# Patient Record
Sex: FEMALE | Race: BLACK OR AFRICAN AMERICAN | NOT HISPANIC OR LATINO | Employment: FULL TIME | URBAN - METROPOLITAN AREA
[De-identification: names, ages, dates, MRNs, and addresses within clinical notes are randomized per-mention and may not be internally consistent; named-entity substitution may affect disease eponyms.]

---

## 2020-04-20 DIAGNOSIS — O09.93 HIGH-RISK PREGNANCY IN THIRD TRIMESTER: Primary | ICD-10-CM

## 2020-04-23 ENCOUNTER — INITIAL CONSULT (OUTPATIENT)
Dept: MATERNAL FETAL MEDICINE | Facility: CLINIC | Age: 25
End: 2020-04-23
Payer: OTHER GOVERNMENT

## 2020-04-23 VITALS
SYSTOLIC BLOOD PRESSURE: 120 MMHG | HEART RATE: 96 BPM | WEIGHT: 204 LBS | HEIGHT: 66 IN | DIASTOLIC BLOOD PRESSURE: 64 MMHG | BODY MASS INDEX: 32.78 KG/M2 | RESPIRATION RATE: 20 BRPM | OXYGEN SATURATION: 98 %

## 2020-04-23 DIAGNOSIS — O09.93 HIGH-RISK PREGNANCY IN THIRD TRIMESTER: ICD-10-CM

## 2020-04-23 PROCEDURE — 76811 OB US DETAILED SNGL FETUS: CPT | Mod: S$GLB,,, | Performed by: OBSTETRICS & GYNECOLOGY

## 2020-04-23 PROCEDURE — 99202 OFFICE O/P NEW SF 15 MIN: CPT | Mod: 95,25,S$GLB, | Performed by: OBSTETRICS & GYNECOLOGY

## 2020-04-23 PROCEDURE — 76811 PR US, OB FETAL EVAL & EXAM, TRANSABDOM,FIRST GESTATION: ICD-10-PCS | Mod: S$GLB,,, | Performed by: OBSTETRICS & GYNECOLOGY

## 2020-04-23 PROCEDURE — 99202 PR OFFICE/OUTPT VISIT, NEW, LEVL II, 15-29 MIN: ICD-10-PCS | Mod: 95,25,S$GLB, | Performed by: OBSTETRICS & GYNECOLOGY

## 2020-04-23 NOTE — PROGRESS NOTES
Initial MFM Consultation  Referring provider: Dr. LOUIE Stone    Indications for referral:  1) Pregnancy at 29 weeks and 5 days gestation ([facility] 7-4-2020)  2) Incomplete anatomic survey    Dear Dr. Stone,  Thank you for your kind request for consultation and imaging of your patient Ms. Vuong at the [facility] at [facility].  She presents due to the above listed indications.  Her history was reviewed and is documented in Epic. She has no complaints today.  She is unsure what portions of the anatomy her OB was unable to visualize.      Physical Exam- deferred due to Covid19 restrictions  Vital signs are normal.      ULTRASOUND FINDINGS:  A detailed fetal anatomic survey was performed. The fetus is cephalic. EFW of 1415 grams is at the 36th percentile.  The placenta is anterior.  Amniotic fluid is normal. There are no fetal structural malformations to extent of view, but the spine is suboptimally visualized due to advanced gestation and fetal positioning.    IMPRESSION:   week gestation complicated by  with a normal ultrasound.    RECOMMENDATIONS/DISCUSSION:  1) We discussed the sonographic findings and their implications.  She was made aware of the limitations of ultrasound.  2) If the spine was not visualized adequately in your office, please re-consult for an additional study in our office.    Thank you for allowing us to participate in her care.  Please do not hesitate to call with questions.

## 2020-04-23 NOTE — LETTER
April 23, 2020        [facility]  [address]  [place] [address]  Phone: [phone]  Fax: [phone]   Patient: Angel Vuong   MR Number: [id number]   YOB: 1995   Date of Visit: 4/23/2020       Dear Dr. Isadora Stone:    Thank you for referring Angel Vuong to me for evaluation. Attached you will find relevant portions of my assessment and plan of care.    If you have questions, please do not hesitate to call me. I look forward to following Angel Vuong along with you.    Sincerely,      Magy Rossi MD        CC  [facility]

## 2020-04-23 NOTE — PROGRESS NOTES
"Angel is here for initial MFM consultation, referred by Dr. Isadora Stone at [facility] for incomplete anatomy scan x 2.    She is feeling fetal movement.    Angel denies vaginal bleeding, loss of fluid, recurrent contractions.      Vitals:    04/23/20 1259   BP: 120/64   Pulse: 96   Resp: 20   SpO2: 98%   Weight: 92.5 kg (204 lb)   Height: 5' 6" (1.676 m)      BMI:                    32.93 kg/m^2             "

## 2020-05-19 DIAGNOSIS — O09.93 HIGH-RISK PREGNANCY IN THIRD TRIMESTER: Primary | ICD-10-CM

## 2020-05-21 ENCOUNTER — PROCEDURE VISIT (OUTPATIENT)
Dept: MATERNAL FETAL MEDICINE | Facility: CLINIC | Age: 25
End: 2020-05-21
Payer: OTHER GOVERNMENT

## 2020-05-21 VITALS
BODY MASS INDEX: 33.43 KG/M2 | SYSTOLIC BLOOD PRESSURE: 112 MMHG | RESPIRATION RATE: 20 BRPM | HEIGHT: 66 IN | HEART RATE: 90 BPM | OXYGEN SATURATION: 99 % | DIASTOLIC BLOOD PRESSURE: 70 MMHG | WEIGHT: 208 LBS

## 2020-05-21 DIAGNOSIS — O09.93 HIGH-RISK PREGNANCY IN THIRD TRIMESTER: ICD-10-CM

## 2020-05-21 PROCEDURE — 99213 OFFICE O/P EST LOW 20 MIN: CPT | Mod: 25,S$GLB,, | Performed by: OBSTETRICS & GYNECOLOGY

## 2020-05-21 PROCEDURE — 76819 FETAL BIOPHYS PROFIL W/O NST: CPT | Mod: S$GLB,,, | Performed by: OBSTETRICS & GYNECOLOGY

## 2020-05-21 PROCEDURE — 99213 PR OFFICE/OUTPT VISIT, EST, LEVL III, 20-29 MIN: ICD-10-PCS | Mod: 25,S$GLB,, | Performed by: OBSTETRICS & GYNECOLOGY

## 2020-05-21 PROCEDURE — 76816 OB US FOLLOW-UP PER FETUS: CPT | Mod: S$GLB,,, | Performed by: OBSTETRICS & GYNECOLOGY

## 2020-05-21 PROCEDURE — 76819 PR US, OB, FETAL BIOPHYSICAL, W/O NST: ICD-10-PCS | Mod: S$GLB,,, | Performed by: OBSTETRICS & GYNECOLOGY

## 2020-05-21 PROCEDURE — 76816 PR  US,PREGNANT UTERUS,F/U,TRANSABD APP: ICD-10-PCS | Mod: S$GLB,,, | Performed by: OBSTETRICS & GYNECOLOGY

## 2020-05-21 NOTE — LETTER
May 21, 2020        [facility]  [address]  [place] [address]  Phone: [phone]  Fax: [phone]   Patient: Angel Vuong   MR Number: [id number]   YOB: 1995   Date of Visit: 5/21/2020       Dear Dr. Isadora Stone:    Thank you for referring Angel Vuong to me for evaluation. Below are the relevant portions of my assessment and plan of care.         If you have questions, please do not hesitate to call me. I look forward to following Angel along with you.    Sincerely,      Quinten Mao MD        [facility]

## 2020-05-21 NOTE — PROGRESS NOTES
Follow-up Maternal-Fetal Medicine note:    Dear Dr. Stone,    Today, I had the opportunity to see your patient, Angel Sellers, at the [facility].  I greatly appreciate your request for follow-up imaging and consultation secondary to sub optimal imaging in the past.  Today, your patient is doing well obstetric [place].  She notes normal fetal movement daily.  She denies vaginal bleeding, leakage of amniotic fluid, and uterine contractions.    Physical examination:  Vital signs:  Blood pressure 112/70, pulse 90, respirations 20, oxygen saturation 99%, weight 208 lb  HEENT:  Grossly within normal limits.  There is no facial edema.  The sclera appear normal  Abdomen:  Benign exam  Extremities:  No ankle edema is palpable    Imaging:  Full imaging was performed today.  Our imaging was summarized report that will be sent separately.  The fetal growth is at the 17th percentile.  This is on the lower end of normal.  The fluid volume was normal.  We see no evidence of fetal compromise.    Impression/recommendations:  We see no evidence of fetal anatomic abnormality.  Most views were adequate.  The baby is at the 17th percentile.  Because the baby is lagging in growth I think a follow-up study in 4 weeks is indicated.  With your permission will see the patient back at that time.  Today, all my impressions were shared with your patient.  She expressed understanding.  Please give me a call if you have any question about the care of your patient.    Sincerely, Quinten